# Patient Record
Sex: FEMALE | Race: WHITE | NOT HISPANIC OR LATINO | Employment: OTHER | URBAN - METROPOLITAN AREA
[De-identification: names, ages, dates, MRNs, and addresses within clinical notes are randomized per-mention and may not be internally consistent; named-entity substitution may affect disease eponyms.]

---

## 2023-03-13 LAB — SEDIMENTATION RATE, ERYTHROCYTE: 2 MM/H (ref 0–30)

## 2023-03-14 LAB — C REACTIVE PROTEIN (MG/L) IN SER/PLAS BY HIGH SENSIT: 1 MG/L

## 2023-11-20 DIAGNOSIS — E03.9 ACQUIRED HYPOTHYROIDISM: Primary | ICD-10-CM

## 2023-11-20 RX ORDER — LEVOTHYROXINE SODIUM 88 UG/1
88 TABLET ORAL
COMMUNITY
Start: 2023-01-26 | End: 2023-11-20 | Stop reason: SDUPTHER

## 2023-11-20 RX ORDER — LEVOTHYROXINE SODIUM 88 UG/1
88 TABLET ORAL DAILY
Qty: 30 TABLET | Refills: 11 | Status: SHIPPED | OUTPATIENT
Start: 2023-11-20 | End: 2024-11-19

## 2023-12-10 RX ORDER — LEVOTHYROXINE SODIUM 88 UG/1
88 TABLET ORAL
Qty: 30 TABLET | Refills: 11 | Status: SHIPPED | OUTPATIENT
Start: 2023-12-10

## 2024-05-22 ENCOUNTER — HOSPITAL ENCOUNTER (OUTPATIENT)
Dept: RADIOLOGY | Facility: HOSPITAL | Age: 79
Discharge: HOME | End: 2024-05-22
Payer: MEDICARE

## 2024-05-22 DIAGNOSIS — M79.605 LEFT LEG PAIN: ICD-10-CM

## 2024-05-22 PROCEDURE — 93971 EXTREMITY STUDY: CPT | Performed by: RADIOLOGY

## 2024-05-22 PROCEDURE — 93971 EXTREMITY STUDY: CPT

## 2024-07-01 ENCOUNTER — APPOINTMENT (OUTPATIENT)
Dept: OTOLARYNGOLOGY | Facility: CLINIC | Age: 79
End: 2024-07-01
Payer: COMMERCIAL

## 2024-07-01 ENCOUNTER — APPOINTMENT (OUTPATIENT)
Dept: AUDIOLOGY | Facility: CLINIC | Age: 79
End: 2024-07-01
Payer: MEDICARE

## 2024-07-01 VITALS — HEIGHT: 60 IN | BODY MASS INDEX: 24.54 KG/M2 | WEIGHT: 125 LBS

## 2024-07-01 DIAGNOSIS — H92.02 LEFT EAR PAIN: ICD-10-CM

## 2024-07-01 DIAGNOSIS — H90.3 BILATERAL SENSORINEURAL HEARING LOSS: ICD-10-CM

## 2024-07-01 DIAGNOSIS — L30.9 DERMATITIS: ICD-10-CM

## 2024-07-01 DIAGNOSIS — H90.3 SENSORINEURAL HEARING LOSS (SNHL) OF BOTH EARS: ICD-10-CM

## 2024-07-01 DIAGNOSIS — R42 DIZZINESS: Primary | ICD-10-CM

## 2024-07-01 PROCEDURE — 1036F TOBACCO NON-USER: CPT | Performed by: OTOLARYNGOLOGY

## 2024-07-01 PROCEDURE — 92550 TYMPANOMETRY & REFLEX THRESH: CPT | Performed by: AUDIOLOGIST

## 2024-07-01 PROCEDURE — 99214 OFFICE O/P EST MOD 30 MIN: CPT | Performed by: OTOLARYNGOLOGY

## 2024-07-01 PROCEDURE — 92557 COMPREHENSIVE HEARING TEST: CPT | Performed by: AUDIOLOGIST

## 2024-07-01 PROCEDURE — 1159F MED LIST DOCD IN RCRD: CPT | Performed by: OTOLARYNGOLOGY

## 2024-07-01 PROCEDURE — 1160F RVW MEDS BY RX/DR IN RCRD: CPT | Performed by: OTOLARYNGOLOGY

## 2024-07-01 RX ORDER — FLUOCINOLONE ACETONIDE 0.11 MG/ML
OIL AURICULAR (OTIC)
Qty: 20 ML | Refills: 1 | Status: SHIPPED | OUTPATIENT
Start: 2024-07-01

## 2024-07-01 NOTE — PROGRESS NOTES
"HPI  Patricia Rodriguez is a 78 y.o. female last seen in 2022 with some imbalance.  She feels that since COVID in late 2022 she has had a form of dizziness that seems to be positional when she stands up or when she looks up to put eyedrops and rolls over in bed.  It is shortness of limited.  Ibuprofen seems to help.  She has not had any specific hearing loss.  Audiogram today with bilateral symmetric downsloping sensorineural hearing loss.  She was seen by Dr. Lerner in May with similar complaints.  She was advised to have vestibular testing done at that time.  This appears to have been performed June 14 and demonstrated the vestibular system appearing to be normal.      Past Medical History:   Diagnosis Date    Other chest pain     Atypical chest pain    Other conditions influencing health status     Cervical Dysplasia    Personal history of other diseases of the digestive system     History of hiatal hernia    Personal history of other diseases of the musculoskeletal system and connective tissue     History of low back pain            Medications:     Current Outpatient Medications:     levothyroxine (Synthroid, Levoxyl) 88 mcg tablet, Take 1 tablet (88 mcg) by mouth once daily., Disp: 30 tablet, Rfl: 11    levothyroxine (Synthroid, Levoxyl) 88 mcg tablet, Take 1 tablet (88 mcg) by mouth once daily in the morning. Take before meals., Disp: 30 tablet, Rfl: 11     Allergies:  No Known Allergies     Physical Exam:  Last Recorded Vitals  Height 1.511 m (4' 11.5\"), weight 56.7 kg (125 lb).  General:     General appearance: Well-developed, well-nourished in no acute distress.       Voice:  normal       Head/face: Normal appearance; nontender to palpation     Facial nerve function: Normal and symmetric bilaterally.    Oral/oropharynx:     Oral vestibule: Normal labial and gingival mucosa     Tongue/floor of mouth: Normal without lesion     Oropharynx: Clear.  No lesions present of the hard/soft palate, posterior " pharynx    Neck:     Neck: Normal appearance, trachea midline     Salivary glands: Normal to palpation bilaterally     Lymph nodes: No cervical lymphadenopathy to palpation     Thyroid: No thyromegaly.  No palpable nodules     Range of motion: Normal    Neurological:     Cortical functions: Alert and oriented x3, appropriate affect       Larynx/hypopharynx:     Laryngeal findings: Mirror exam inadequate or limited secondary to enlarged base of tongue and/or excessive gagging    Ear:     Ear canal: Normal bilaterally     Tympanic membrane: Intact and mobile bilaterally     Pinna: Normal bilaterally     Hearing:  Gross hearing assessment normal by voice  [place]-Hallpike testing negative bilaterally    Nose:     Visualized using: Anterior rhinoscopy     Nasopharynx: Inadequate mirror exam secondary to gag, anatomy.       Nasal dorsum: Nontraumatic midline appearance     Septum: Midline     Inferior turbinates: Normally sized     Mucosa: Bilateral, pink, normal appearing       ASSESSMENT/PLAN:  Sounds like BPPV although the duration of symptoms is a little atypical.  [place]-Hallpike testing negative bilaterally.  Peripheral testing negative.  Recommend vestibular rehab.  Fluocinolone oil sent at her request.  She has used before for some flaky skin of the ear canal.  Follow-up as needed      Freddie Sewell MD

## 2024-07-03 NOTE — PROGRESS NOTES
AUDIOLOGY ADULT AUDIOMETRIC EVALUATION    Name:  Patricia Rodriguez  :  1945  Age:  78 y.o.  Date of Evaluation:  2024    Reason for visit: Patricia is seen in the clinic today at the request of otolaryngology for an audiologic evaluation.     HISTORY  Patient reports since having Covid in  she has he dizziness; positional.   Looking up, tilting head for eye drops; getting out of bed.  She reports she had MRI which was negative;  seen by neurology.  She reports constant left ear pain; varies in intensity.  She reports she can have difficulty understanding others depending on the speaker.      EVALUATION  See scanned audiogram: “Media” > “Audiology Report”.      RESULTS  Otoscopic Evaluation:  Right Ear: clear ear canal  Left Ear: clear ear canal    Immittance Measures:  Tympanometry:  Right Ear: Type As, reduced tympanic membrane mobility with normal middle ear pressure  Left Ear: Type Ad, hypercompliant tympanic membrane mobility with normal middle ear pressure    Acoustic Reflexes:  Ipsilateral Right Ear: Acoustic reflexes present within normal limits 500Hz through 4KHz   Ipsilateral Left Ear: could not seal  Contralateral Right Ear: did not evaluate  Contralateral Left Ear: did not evaluate    Distortion Product Otoacoustic Emissions (DPOAEs):  Right Ear: refer at all frequencies   Left Ear: refer at all frequencies     Audiometry:  Test Technique and Reliability:   Standard audiometry via supra-aural headphones. Reliability is good.    Pure tone air and bone conduction audiometry:  Mild sloping to severe sensorineural hearing loss at 1KHz and above bilaterally    Speech Audiometry (Word Recognition Scores):   Right Ear: Fair at most comfortable listening level of loudness   Left Ear: Fair at most comfortable listening level of loudness     IMPRESSIONS    Mild sloping to severe sensorineural hearing loss at 1KHz and above bilaterally    RECOMMENDATIONS  - Follow up with otolaryngology today as  scheduled; possible BPPV  - Annual audiologic evaluation, sooner if an acute change is noted.  - Hearing aid evaluation     PATIENT EDUCATION  Discussed results, impressions and recommendations with the patient. Questions were addressed and the patient was encouraged to contact our office should concerns arise.    Time for this encounter: 100/135    Ramandeep Nogueira M.A., CCC/A   Licensed Audiologist

## 2024-10-15 ENCOUNTER — OFFICE VISIT (OUTPATIENT)
Dept: PRIMARY CARE | Facility: CLINIC | Age: 79
End: 2024-10-15
Payer: MEDICARE

## 2024-10-15 VITALS
BODY MASS INDEX: 25.64 KG/M2 | OXYGEN SATURATION: 98 % | SYSTOLIC BLOOD PRESSURE: 122 MMHG | HEIGHT: 59 IN | TEMPERATURE: 98 F | DIASTOLIC BLOOD PRESSURE: 72 MMHG | WEIGHT: 127.2 LBS | HEART RATE: 72 BPM

## 2024-10-15 DIAGNOSIS — Z12.31 ENCOUNTER FOR SCREENING MAMMOGRAM FOR MALIGNANT NEOPLASM OF BREAST: ICD-10-CM

## 2024-10-15 DIAGNOSIS — E03.9 ACQUIRED HYPOTHYROIDISM: ICD-10-CM

## 2024-10-15 DIAGNOSIS — Z00.00 PHYSICAL EXAM: Primary | ICD-10-CM

## 2024-10-15 DIAGNOSIS — N32.81 OVERACTIVE BLADDER: ICD-10-CM

## 2024-10-15 DIAGNOSIS — Z13.6 SCREENING FOR HEART DISEASE: ICD-10-CM

## 2024-10-15 DIAGNOSIS — R53.83 TIRED: ICD-10-CM

## 2024-10-15 DIAGNOSIS — M25.551 RIGHT HIP PAIN: ICD-10-CM

## 2024-10-15 DIAGNOSIS — Z12.31 ENCOUNTER FOR SCREENING MAMMOGRAM FOR BREAST CANCER: ICD-10-CM

## 2024-10-15 DIAGNOSIS — F51.01 PRIMARY INSOMNIA: ICD-10-CM

## 2024-10-15 DIAGNOSIS — B35.9 TINEA: ICD-10-CM

## 2024-10-15 PROCEDURE — 1170F FXNL STATUS ASSESSED: CPT | Performed by: FAMILY MEDICINE

## 2024-10-15 PROCEDURE — 1160F RVW MEDS BY RX/DR IN RCRD: CPT | Performed by: FAMILY MEDICINE

## 2024-10-15 PROCEDURE — 1159F MED LIST DOCD IN RCRD: CPT | Performed by: FAMILY MEDICINE

## 2024-10-15 PROCEDURE — 99215 OFFICE O/P EST HI 40 MIN: CPT | Performed by: FAMILY MEDICINE

## 2024-10-15 PROCEDURE — G0439 PPPS, SUBSEQ VISIT: HCPCS | Performed by: FAMILY MEDICINE

## 2024-10-15 RX ORDER — ECONAZOLE NITRATE 10 MG/G
CREAM TOPICAL 2 TIMES DAILY PRN
Qty: 30 G | Refills: 0 | Status: SHIPPED | OUTPATIENT
Start: 2024-10-15 | End: 2025-02-12

## 2024-10-15 RX ORDER — HYDROXYZINE HYDROCHLORIDE 25 MG/1
25 TABLET, FILM COATED ORAL NIGHTLY PRN
Qty: 30 TABLET | Refills: 5 | Status: SHIPPED | OUTPATIENT
Start: 2024-10-15

## 2024-10-15 RX ORDER — ECONAZOLE NITRATE 10 MG/G
CREAM TOPICAL 2 TIMES DAILY PRN
Qty: 30 G | Refills: 0 | Status: SHIPPED | OUTPATIENT
Start: 2024-10-15 | End: 2024-10-15

## 2024-10-15 RX ORDER — SOLIFENACIN SUCCINATE 5 MG/1
5 TABLET, FILM COATED ORAL DAILY
Qty: 30 TABLET | Refills: 11 | Status: SHIPPED | OUTPATIENT
Start: 2024-10-15 | End: 2025-10-15

## 2024-10-15 RX ORDER — LEVOTHYROXINE SODIUM 88 UG/1
88 TABLET ORAL
Qty: 30 TABLET | Refills: 11 | Status: SHIPPED | OUTPATIENT
Start: 2024-10-15

## 2024-10-15 RX ORDER — GUAIFENESIN 1200 MG
1 TABLET, EXTENDED RELEASE 12 HR ORAL
COMMUNITY

## 2024-10-15 RX ORDER — LEVOTHYROXINE SODIUM 88 UG/1
88 TABLET ORAL
Qty: 30 TABLET | Refills: 11 | Status: SHIPPED | OUTPATIENT
Start: 2024-10-15 | End: 2024-10-15

## 2024-10-15 RX ORDER — ACETAMINOPHEN 500 MG
500 TABLET ORAL EVERY 8 HOURS
COMMUNITY
Start: 2023-03-13

## 2024-10-15 ASSESSMENT — ACTIVITIES OF DAILY LIVING (ADL)
DOING_HOUSEWORK: INDEPENDENT
TAKING_MEDICATION: INDEPENDENT
MANAGING_FINANCES: INDEPENDENT
GROCERY_SHOPPING: INDEPENDENT
DRESSING: INDEPENDENT
BATHING: INDEPENDENT

## 2024-10-15 ASSESSMENT — PATIENT HEALTH QUESTIONNAIRE - PHQ9
2. FEELING DOWN, DEPRESSED OR HOPELESS: NOT AT ALL
1. LITTLE INTEREST OR PLEASURE IN DOING THINGS: NOT AT ALL
SUM OF ALL RESPONSES TO PHQ9 QUESTIONS 1 AND 2: 0

## 2024-10-15 ASSESSMENT — ENCOUNTER SYMPTOMS
LOSS OF SENSATION IN FEET: 0
DEPRESSION: 0
OCCASIONAL FEELINGS OF UNSTEADINESS: 0

## 2024-10-16 ENCOUNTER — TELEPHONE (OUTPATIENT)
Dept: PRIMARY CARE | Facility: CLINIC | Age: 79
End: 2024-10-16

## 2024-10-16 ENCOUNTER — LAB (OUTPATIENT)
Dept: LAB | Facility: LAB | Age: 79
End: 2024-10-16
Payer: COMMERCIAL

## 2024-10-16 ENCOUNTER — HOSPITAL ENCOUNTER (OUTPATIENT)
Dept: RADIOLOGY | Facility: CLINIC | Age: 79
Discharge: HOME | End: 2024-10-16
Payer: MEDICARE

## 2024-10-16 DIAGNOSIS — M25.551 RIGHT HIP PAIN: ICD-10-CM

## 2024-10-16 DIAGNOSIS — R53.83 TIRED: ICD-10-CM

## 2024-10-16 DIAGNOSIS — E03.9 ACQUIRED HYPOTHYROIDISM: ICD-10-CM

## 2024-10-16 DIAGNOSIS — Z00.00 PHYSICAL EXAM: ICD-10-CM

## 2024-10-16 DIAGNOSIS — Z13.6 SCREENING FOR HEART DISEASE: ICD-10-CM

## 2024-10-16 LAB
ALBUMIN SERPL BCP-MCNC: 4 G/DL (ref 3.4–5)
ALP SERPL-CCNC: 65 U/L (ref 33–136)
ALT SERPL W P-5'-P-CCNC: 17 U/L (ref 7–45)
ANION GAP SERPL CALCULATED.3IONS-SCNC: 10 MMOL/L (ref 10–20)
AST SERPL W P-5'-P-CCNC: 24 U/L (ref 9–39)
BASOPHILS # BLD AUTO: 0.03 X10*3/UL (ref 0–0.1)
BASOPHILS NFR BLD AUTO: 0.4 %
BILIRUB SERPL-MCNC: 0.6 MG/DL (ref 0–1.2)
BUN SERPL-MCNC: 9 MG/DL (ref 6–23)
CALCIUM SERPL-MCNC: 9.4 MG/DL (ref 8.6–10.3)
CHLORIDE SERPL-SCNC: 103 MMOL/L (ref 98–107)
CHOLEST SERPL-MCNC: 173 MG/DL (ref 0–199)
CHOLEST/HDLC SERPL: 2.5 {RATIO}
CO2 SERPL-SCNC: 33 MMOL/L (ref 21–32)
CREAT SERPL-MCNC: 0.72 MG/DL (ref 0.5–1.05)
EGFRCR SERPLBLD CKD-EPI 2021: 85 ML/MIN/1.73M*2
EOSINOPHIL # BLD AUTO: 0.09 X10*3/UL (ref 0–0.4)
EOSINOPHIL NFR BLD AUTO: 1.3 %
ERYTHROCYTE [DISTWIDTH] IN BLOOD BY AUTOMATED COUNT: 12.2 % (ref 11.5–14.5)
GLUCOSE SERPL-MCNC: 85 MG/DL (ref 74–99)
HCT VFR BLD AUTO: 46.6 % (ref 36–46)
HDLC SERPL-MCNC: 68.2 MG/DL
HGB BLD-MCNC: 15.3 G/DL (ref 12–16)
IMM GRANULOCYTES # BLD AUTO: 0.02 X10*3/UL (ref 0–0.5)
IMM GRANULOCYTES NFR BLD AUTO: 0.3 % (ref 0–0.9)
LDLC SERPL CALC-MCNC: 90 MG/DL
LYMPHOCYTES # BLD AUTO: 1.93 X10*3/UL (ref 0.8–3)
LYMPHOCYTES NFR BLD AUTO: 27.5 %
MCH RBC QN AUTO: 32.4 PG (ref 26–34)
MCHC RBC AUTO-ENTMCNC: 32.8 G/DL (ref 32–36)
MCV RBC AUTO: 99 FL (ref 80–100)
MONOCYTES # BLD AUTO: 0.69 X10*3/UL (ref 0.05–0.8)
MONOCYTES NFR BLD AUTO: 9.8 %
NEUTROPHILS # BLD AUTO: 4.27 X10*3/UL (ref 1.6–5.5)
NEUTROPHILS NFR BLD AUTO: 60.7 %
NON HDL CHOLESTEROL: 105 MG/DL (ref 0–149)
NRBC BLD-RTO: 0 /100 WBCS (ref 0–0)
PLATELET # BLD AUTO: 356 X10*3/UL (ref 150–450)
POTASSIUM SERPL-SCNC: 4 MMOL/L (ref 3.5–5.3)
PROT SERPL-MCNC: 7.1 G/DL (ref 6.4–8.2)
RBC # BLD AUTO: 4.72 X10*6/UL (ref 4–5.2)
SODIUM SERPL-SCNC: 142 MMOL/L (ref 136–145)
TRIGL SERPL-MCNC: 74 MG/DL (ref 0–149)
TSH SERPL-ACNC: 0.71 MIU/L (ref 0.44–3.98)
VIT B12 SERPL-MCNC: 392 PG/ML (ref 211–911)
VLDL: 15 MG/DL (ref 0–40)
WBC # BLD AUTO: 7 X10*3/UL (ref 4.4–11.3)

## 2024-10-16 PROCEDURE — 82607 VITAMIN B-12: CPT

## 2024-10-16 PROCEDURE — 73502 X-RAY EXAM HIP UNI 2-3 VIEWS: CPT | Mod: RT

## 2024-10-16 PROCEDURE — 73502 X-RAY EXAM HIP UNI 2-3 VIEWS: CPT | Mod: RIGHT SIDE | Performed by: RADIOLOGY

## 2024-10-16 NOTE — TELEPHONE ENCOUNTER
Patient was here yesterday requesting chest xray due to discomfort in chest when sitting and when she lay down it bothers her more her mother had lung cancer so she is always worried please advise

## 2024-11-13 ENCOUNTER — HOSPITAL ENCOUNTER (OUTPATIENT)
Dept: RADIOLOGY | Facility: HOSPITAL | Age: 79
Discharge: HOME | End: 2024-11-13
Payer: MEDICARE

## 2024-11-13 DIAGNOSIS — Z12.31 ENCOUNTER FOR SCREENING MAMMOGRAM FOR BREAST CANCER: ICD-10-CM

## 2024-11-13 PROCEDURE — 77063 BREAST TOMOSYNTHESIS BI: CPT | Performed by: RADIOLOGY

## 2024-11-13 PROCEDURE — 77067 SCR MAMMO BI INCL CAD: CPT | Performed by: RADIOLOGY

## 2024-11-13 PROCEDURE — 77067 SCR MAMMO BI INCL CAD: CPT

## 2024-12-08 ASSESSMENT — ENCOUNTER SYMPTOMS
DIFFICULTY URINATING: 0
DIARRHEA: 0
SHORTNESS OF BREATH: 0
ENDOCRINE NEGATIVE: 1
DIZZINESS: 0
FEVER: 0
NAUSEA: 0

## 2024-12-09 NOTE — PROGRESS NOTES
"Subjective   Patient ID: Patricia Rodriguez is a 78 y.o. female who presents for Medicare Annual Wellness Visit Subsequent.    Physical exam  Hypothyroidism  Urinary incontinence frequent urination, overactive bladder  Insomnia  Right hip pain worse with ambulation  Tinea  Fatigue  Mammogram ordered         Review of Systems   Constitutional:  Negative for fever.        Also see HPI   Eyes:  Negative for visual disturbance.   Respiratory:  Negative for shortness of breath.    Cardiovascular:  Negative for chest pain.   Gastrointestinal:  Negative for diarrhea and nausea.   Endocrine: Negative.    Genitourinary:  Negative for difficulty urinating.   Skin:  Negative for rash.   Neurological:  Negative for dizziness.        No focal deficits   Psychiatric/Behavioral:  Negative for suicidal ideas.    All other systems reviewed and are negative.      Objective   /72   Pulse 72   Temp 36.7 °C (98 °F)   Ht 1.499 m (4' 11\")   Wt 57.7 kg (127 lb 3.2 oz)   LMP  (LMP Unknown)   SpO2 98%   BMI 25.69 kg/m²     Physical Exam  Vitals and nursing note reviewed.   Constitutional:       Appearance: Normal appearance.   HENT:      Head: Normocephalic and atraumatic.      Right Ear: Tympanic membrane, ear canal and external ear normal.      Left Ear: Tympanic membrane, ear canal and external ear normal.      Nose: Nose normal.      Mouth/Throat:      Mouth: Mucous membranes are moist.      Pharynx: Oropharynx is clear.   Eyes:      Extraocular Movements: Extraocular movements intact.      Conjunctiva/sclera: Conjunctivae normal.      Pupils: Pupils are equal, round, and reactive to light.   Cardiovascular:      Rate and Rhythm: Normal rate and regular rhythm.      Heart sounds: Normal heart sounds.   Pulmonary:      Effort: No respiratory distress.      Breath sounds: Normal breath sounds.   Abdominal:      General: Bowel sounds are normal. There is no distension.      Palpations: Abdomen is soft. There is no mass.      " Tenderness: There is no abdominal tenderness.   Musculoskeletal:      Cervical back: Normal range of motion and neck supple.      Right hip: Tenderness present.      Right lower leg: No edema.      Left lower leg: No edema.   Skin:     Coloration: Skin is not jaundiced.      Findings: No rash.   Neurological:      General: No focal deficit present.      Mental Status: She is alert and oriented to person, place, and time.   Psychiatric:         Mood and Affect: Mood normal.         Speech: Speech normal.         Behavior: Behavior normal.         Thought Content: Thought content normal.         Judgment: Judgment normal.         Assessment/Plan   Diagnoses and all orders for this visit:  Physical exam  -     CBC and Auto Differential; Future  -     Comprehensive Metabolic Panel; Future  -     TSH with reflex to Free T4 if abnormal; Future  -     Lipid Panel; Future  -     Vitamin B12; Future  Acquired hypothyroidism  -     levothyroxine (Synthroid, Levoxyl) 88 mcg tablet; Take 1 tablet (88 mcg) by mouth once daily in the morning. Take before meals.  -     TSH with reflex to Free T4 if abnormal; Future  Tinea  -     econazole nitrate 1 % cream; Apply topically 2 times a day as needed for irritation or rash.  Right hip pain  -     XR hip right with pelvis when performed 2 or 3 views; Future  Overactive bladder  -     solifenacin (Vesicare) 5 mg tablet; Take 1 tablet (5 mg) by mouth once daily. Swallow tablet whole; do not crush, chew, or split.  Primary insomnia  -     hydrOXYzine HCL (Atarax) 25 mg tablet; Take 1 tablet (25 mg) by mouth as needed at bedtime (insomnia).  Encounter for screening mammogram for malignant neoplasm of breast  Screening for heart disease  -     Lipid Panel; Future  Tired  -     CBC and Auto Differential; Future  -     Comprehensive Metabolic Panel; Future  -     TSH with reflex to Free T4 if abnormal; Future  -     Vitamin B12; Future  Encounter for screening mammogram for breast cancer  -      BI mammo bilateral screening tomosynthesis; Future

## 2025-03-17 ENCOUNTER — APPOINTMENT (OUTPATIENT)
Dept: CARDIOLOGY | Facility: CLINIC | Age: 80
End: 2025-03-17
Payer: MEDICARE

## 2025-04-08 ENCOUNTER — APPOINTMENT (OUTPATIENT)
Dept: CARDIOLOGY | Facility: CLINIC | Age: 80
End: 2025-04-08
Payer: MEDICARE

## 2025-04-08 VITALS
OXYGEN SATURATION: 96 % | BODY MASS INDEX: 25.04 KG/M2 | DIASTOLIC BLOOD PRESSURE: 84 MMHG | HEART RATE: 66 BPM | SYSTOLIC BLOOD PRESSURE: 116 MMHG | WEIGHT: 124 LBS | RESPIRATION RATE: 16 BRPM

## 2025-04-08 DIAGNOSIS — E78.2 MIXED HYPERLIPIDEMIA: Primary | ICD-10-CM

## 2025-04-08 DIAGNOSIS — R07.89 OTHER CHEST PAIN: ICD-10-CM

## 2025-04-08 PROCEDURE — 1159F MED LIST DOCD IN RCRD: CPT | Performed by: INTERNAL MEDICINE

## 2025-04-08 PROCEDURE — 99203 OFFICE O/P NEW LOW 30 MIN: CPT | Performed by: INTERNAL MEDICINE

## 2025-04-08 PROCEDURE — 1126F AMNT PAIN NOTED NONE PRSNT: CPT | Performed by: INTERNAL MEDICINE

## 2025-04-08 PROCEDURE — 1160F RVW MEDS BY RX/DR IN RCRD: CPT | Performed by: INTERNAL MEDICINE

## 2025-04-08 PROCEDURE — 1036F TOBACCO NON-USER: CPT | Performed by: INTERNAL MEDICINE

## 2025-04-08 ASSESSMENT — LIFESTYLE VARIABLES
HOW OFTEN DO YOU HAVE SIX OR MORE DRINKS ON ONE OCCASION: NEVER
HAVE YOU OR SOMEONE ELSE BEEN INJURED AS A RESULT OF YOUR DRINKING: NO
HAS A RELATIVE, FRIEND, DOCTOR, OR ANOTHER HEALTH PROFESSIONAL EXPRESSED CONCERN ABOUT YOUR DRINKING OR SUGGESTED YOU CUT DOWN: NO
AUDIT-C TOTAL SCORE: 0
SKIP TO QUESTIONS 9-10: 1
HOW MANY STANDARD DRINKS CONTAINING ALCOHOL DO YOU HAVE ON A TYPICAL DAY: PATIENT DOES NOT DRINK
AUDIT TOTAL SCORE: 0
HOW OFTEN DO YOU HAVE A DRINK CONTAINING ALCOHOL: NEVER

## 2025-04-08 ASSESSMENT — ENCOUNTER SYMPTOMS
OCCASIONAL FEELINGS OF UNSTEADINESS: 0
DEPRESSION: 0
LOSS OF SENSATION IN FEET: 0

## 2025-04-08 ASSESSMENT — PAIN SCALES - GENERAL: PAINLEVEL_OUTOF10: 0-NO PAIN

## 2025-04-08 NOTE — PROGRESS NOTES
Reason for the consult:  Chest pain    History of present illness:  This is a very pleasant 79-year-old female with no prior cardiac history.  Patient presents to my office with complaints of atypical chest pain.  Patient used to see Dr. Yanick Lindsey in the past she had stress test in May 2023 that showed normal ejection fraction no major Samantha.  She had also echocardiogram in the past as well as EKG all within normal limit.  Patient has been having this sharp or pressure intermittent episodes of quick chest pains happening on the left side.  She is signs the chest pain area with her fingertip.  These pains are not related to activity.  Denies any nausea vomiting diaphoresis or syncope.      Past Medical History:   Diagnosis Date    Other chest pain     Atypical chest pain    Other conditions influencing health status     Cervical Dysplasia    Personal history of other diseases of the digestive system     History of hiatal hernia    Personal history of other diseases of the musculoskeletal system and connective tissue     History of low back pain       Past Surgical History:   Procedure Laterality Date    APPENDECTOMY  04/29/2013    Appendectomy    TUBAL LIGATION  04/29/2013    Tubal Ligation       Allergies   Allergen Reactions    Amoxicillin Unknown        reports that she has never smoked. She has never used smokeless tobacco. She reports that she does not currently use alcohol. She reports that she does not use drugs.    Family History   Problem Relation Name Age of Onset    Breast cancer Mother  75       Patient's Medications   New Prescriptions    No medications on file   Previous Medications    ACETAMINOPHEN (TYLENOL) 325 MG CAPSULE    Take 1 capsule (325 mg) by mouth.    ACETAMINOPHEN (TYLENOL) 500 MG TABLET    Take 1 tablet (500 mg) by mouth every 8 hours.    FLUOCINOLONE (DERMOTIC) 0.01 % EAR DROPS    4 drops to affected ear as directed    HYDROXYZINE HCL (ATARAX) 25 MG TABLET    Take 1 tablet (25 mg) by  mouth as needed at bedtime (insomnia).    LEVOTHYROXINE (SYNTHROID, LEVOXYL) 88 MCG TABLET    Take 1 tablet (88 mcg) by mouth once daily in the morning. Take before meals.    SOLIFENACIN (VESICARE) 5 MG TABLET    Take 1 tablet (5 mg) by mouth once daily. Swallow tablet whole; do not crush, chew, or split.   Modified Medications    No medications on file   Discontinued Medications    No medications on file         Review of Systems  10 point review of systems otherwise negative     Objective   Physical Exam  General: Patient in no acute distress   HEENT: Atraumatic normocephalic.  Neck: Supple, jugular venous pressure within normal limit.  No bruits  Lungs: Clear to auscultation bilaterally  Cardiovascular: Regular rate and rhythm, normal heart sounds, no murmurs rubs or gallops  Abdomen: Soft nontender nondistended.  Normal bowel sounds.  Extremities: Warm to touch, no edema.  Neurologic examination: Patient is awake alert oriented x3.  Psychiatric examination: Patient denies being depressed or suicidal.  Good insight  Vascular examination: Pulses intact bilaterally     Lab Review   No visits with results within 2 Month(s) from this visit.   Latest known visit with results is:   Lab on 10/16/2024   Component Date Value    WBC 10/16/2024 7.0     nRBC 10/16/2024 0.0     RBC 10/16/2024 4.72     Hemoglobin 10/16/2024 15.3     Hematocrit 10/16/2024 46.6 (H)     MCV 10/16/2024 99     MCH 10/16/2024 32.4     MCHC 10/16/2024 32.8     RDW 10/16/2024 12.2     Platelets 10/16/2024 356     Neutrophils % 10/16/2024 60.7     Immature Granulocytes %,* 10/16/2024 0.3     Lymphocytes % 10/16/2024 27.5     Monocytes % 10/16/2024 9.8     Eosinophils % 10/16/2024 1.3     Basophils % 10/16/2024 0.4     Neutrophils Absolute 10/16/2024 4.27     Immature Granulocytes Ab* 10/16/2024 0.02     Lymphocytes Absolute 10/16/2024 1.93     Monocytes Absolute 10/16/2024 0.69     Eosinophils Absolute 10/16/2024 0.09     Basophils Absolute 10/16/2024  0.03     Glucose 10/16/2024 85     Sodium 10/16/2024 142     Potassium 10/16/2024 4.0     Chloride 10/16/2024 103     Bicarbonate 10/16/2024 33 (H)     Anion Gap 10/16/2024 10     Urea Nitrogen 10/16/2024 9     Creatinine 10/16/2024 0.72     eGFR 10/16/2024 85     Calcium 10/16/2024 9.4     Albumin 10/16/2024 4.0     Alkaline Phosphatase 10/16/2024 65     Total Protein 10/16/2024 7.1     AST 10/16/2024 24     Bilirubin, Total 10/16/2024 0.6     ALT 10/16/2024 17     Thyroid Stimulating Horm* 10/16/2024 0.71     Cholesterol 10/16/2024 173     HDL-Cholesterol 10/16/2024 68.2     Cholesterol/HDL Ratio 10/16/2024 2.5     LDL Calculated 10/16/2024 90     VLDL 10/16/2024 15     Triglycerides 10/16/2024 74     Non HDL Cholesterol 10/16/2024 105     Vitamin B12 10/16/2024 392         Assessment/Plan    This is a very pleasant 79-year-old female with no prior cardiac history.  Patient presents to my office with complaints of atypical chest pain.  Patient used to see Dr. Yanick Lindsey in the past she had stress test in May 2023 that showed normal ejection fraction no major Samantha.  She had also echocardiogram in the past as well as EKG all within normal limit.  Patient has been having this sharp or pressure intermittent episodes of quick chest pains happening on the left side.  She is signs the chest pain area with her fingertip.  These pains are not related to activity.  Denies any nausea vomiting diaphoresis or syncope.    Pain is atypical.  Denies having any additional symptoms.  Blood pressure noted well-controlled.  On optimal medical therapy.  Cholesterol well-controlled.  I really doubt this is cardiac.  Will obtain coronary calcium score to assess need for statin therapy if she has elevated calcium score we may consider cardiac catheterization if she remains symptomatic otherwise I will see her in 3 months.      Jose Alfredo Nguyen MD

## 2025-04-18 ENCOUNTER — HOSPITAL ENCOUNTER (OUTPATIENT)
Dept: RADIOLOGY | Facility: HOSPITAL | Age: 80
Discharge: HOME | End: 2025-04-18
Payer: MEDICARE

## 2025-04-18 DIAGNOSIS — E78.2 MIXED HYPERLIPIDEMIA: ICD-10-CM

## 2025-04-18 PROCEDURE — 75571 CT HRT W/O DYE W/CA TEST: CPT

## 2025-04-22 ENCOUNTER — TELEPHONE (OUTPATIENT)
Dept: CARDIOLOGY | Facility: CLINIC | Age: 80
End: 2025-04-22
Payer: COMMERCIAL

## 2025-04-22 DIAGNOSIS — R91.1 LUNG NODULE: Primary | ICD-10-CM

## 2025-04-22 NOTE — TELEPHONE ENCOUNTER
Spoke to patient about the result of her coronary calcium score.  Total score of 2 low risk.  However she has incidental lung nodule of 9 mm if new findings we will refer for lung nodule clinic.  Patient understands.

## 2025-04-30 ENCOUNTER — TELEPHONE (OUTPATIENT)
Dept: CARE COORDINATION | Facility: CLINIC | Age: 80
End: 2025-04-30
Payer: COMMERCIAL

## 2025-04-30 NOTE — PROGRESS NOTES
Outreach call to patient to assist in scheduling Annual Wellness Visit. Left voicemail with the following information below:    Our records indicate that you are due for your 2025 Yearly Annual Wellness Exam.    It is a very important part of your over all health to have your yearly exam with your Primary Care Provider.   During this appointment you will discuss routine screenings and vaccines you may be due for with your provider.         Please call your Primary Care Physician's office at your earliest convenience to get scheduled for your visit OR you may schedule through your [employer] account.     Sincerely,    Our caring team at [facility]     **Details below show attributed Primary Care Providers Office information:     [facility]  [address] [facility] [address]   Scheduling # [phone]

## 2025-05-02 ENCOUNTER — OFFICE VISIT (OUTPATIENT)
Dept: PULMONOLOGY | Facility: CLINIC | Age: 80
End: 2025-05-02
Payer: MEDICARE

## 2025-05-02 VITALS
RESPIRATION RATE: 16 BRPM | SYSTOLIC BLOOD PRESSURE: 175 MMHG | WEIGHT: 124 LBS | OXYGEN SATURATION: 96 % | BODY MASS INDEX: 25.04 KG/M2 | DIASTOLIC BLOOD PRESSURE: 75 MMHG | HEART RATE: 76 BPM

## 2025-05-02 DIAGNOSIS — R91.1 LUNG NODULE: Primary | ICD-10-CM

## 2025-05-02 DIAGNOSIS — R07.89 OTHER CHEST PAIN: ICD-10-CM

## 2025-05-02 PROCEDURE — 1160F RVW MEDS BY RX/DR IN RCRD: CPT | Performed by: INTERNAL MEDICINE

## 2025-05-02 PROCEDURE — 1159F MED LIST DOCD IN RCRD: CPT | Performed by: INTERNAL MEDICINE

## 2025-05-02 PROCEDURE — 1036F TOBACCO NON-USER: CPT | Performed by: INTERNAL MEDICINE

## 2025-05-02 PROCEDURE — 1126F AMNT PAIN NOTED NONE PRSNT: CPT | Performed by: INTERNAL MEDICINE

## 2025-05-02 PROCEDURE — 99204 OFFICE O/P NEW MOD 45 MIN: CPT | Performed by: INTERNAL MEDICINE

## 2025-05-02 PROCEDURE — 99214 OFFICE O/P EST MOD 30 MIN: CPT | Performed by: INTERNAL MEDICINE

## 2025-05-02 RX ORDER — FLUOROMETHOLONE 1 MG/ML
1 SUSPENSION/ DROPS OPHTHALMIC 2 TIMES DAILY
COMMUNITY
Start: 2025-04-07

## 2025-05-02 ASSESSMENT — ENCOUNTER SYMPTOMS
FEVER: 0
GASTROINTESTINAL NEGATIVE: 1
COUGH: 0
PSYCHIATRIC NEGATIVE: 1
RESPIRATORY NEGATIVE: 1
NEUROLOGICAL NEGATIVE: 1
DEPRESSION: 0
CHILLS: 0

## 2025-05-02 ASSESSMENT — COLUMBIA-SUICIDE SEVERITY RATING SCALE - C-SSRS
1. IN THE PAST MONTH, HAVE YOU WISHED YOU WERE DEAD OR WISHED YOU COULD GO TO SLEEP AND NOT WAKE UP?: NO
2. HAVE YOU ACTUALLY HAD ANY THOUGHTS OF KILLING YOURSELF?: NO
6. HAVE YOU EVER DONE ANYTHING, STARTED TO DO ANYTHING, OR PREPARED TO DO ANYTHING TO END YOUR LIFE?: NO

## 2025-05-02 ASSESSMENT — PAIN SCALES - GENERAL: PAINLEVEL_OUTOF10: 0-NO PAIN

## 2025-05-02 ASSESSMENT — PATIENT HEALTH QUESTIONNAIRE - PHQ9
SUM OF ALL RESPONSES TO PHQ9 QUESTIONS 1 AND 2: 0
2. FEELING DOWN, DEPRESSED OR HOPELESS: NOT AT ALL
1. LITTLE INTEREST OR PLEASURE IN DOING THINGS: NOT AT ALL

## 2025-05-02 NOTE — ASSESSMENT & PLAN NOTE
Clinical seems like GERD given pts symptoms and clinical history of previous GERD and gastric ulcer.  Advised pt to discuss with her PCP.

## 2025-05-02 NOTE — PROGRESS NOTES
Subjective   Patient ID: Patricia Rodriguez is a 79 y.o. female who presents for Lung Eval.  H/o hypothyroid here for evaluation of lung nodule.  9mm nodule seen on calcium scoring.  Has chest pains she describes in the center of her chest and on the right side.  No fevers, chills.  Has had cough since covid 2 years ago in morning and at night.  Has occasional night sweats.  No weight loss.  Denies any lung disease.  ET is about 75 yards.  Reports chest pain as a burning/squeezing sensation in the middle of her chest.  Reports acid reflux for years.  Was on PPI for awhile but stopped     Never smoker.  Worked in a lab making biopsy brushes/needles.  Had a dog.  Mother, aunts, uncles - lung Ca.              Review of Systems   Constitutional:  Negative for chills and fever.   Respiratory: Negative.  Negative for cough.    Cardiovascular:  Positive for chest pain.   Gastrointestinal: Negative.    Neurological: Negative.    Psychiatric/Behavioral: Negative.     All other systems reviewed and are negative.      Objective   Physical Exam  Vitals reviewed.   Constitutional:       Appearance: Normal appearance.   HENT:      Head: Normocephalic and atraumatic.   Eyes:      Extraocular Movements: Extraocular movements intact.   Cardiovascular:      Rate and Rhythm: Normal rate and regular rhythm.      Heart sounds: Normal heart sounds.   Pulmonary:      Effort: Pulmonary effort is normal.      Breath sounds: Normal breath sounds.   Abdominal:      Palpations: Abdomen is soft.      Tenderness: There is no abdominal tenderness.   Musculoskeletal:      Cervical back: Normal range of motion.   Skin:     General: Skin is warm.   Neurological:      General: No focal deficit present.      Mental Status: She is alert and oriented to person, place, and time. Mental status is at baseline.   Psychiatric:         Mood and Affect: Mood normal.         Behavior: Behavior normal.         Assessment/Plan   Problem List Items Addressed This Visit        Other chest pain    Clinical seems like GERD given pts symptoms and clinical history of previous GERD and gastric ulcer.  Advised pt to discuss with her PCP.         Lung nodule - Primary    Will get CT-chest w/o contrast to evaluate for other nodules particularly given family history of lung Ca.         Relevant Orders    CT chest wo IV contrast     RTC in 1-2 months    Time Spent  Prep time on day of patient encounter: 15 minutes  Time spent directly with patient, family or caregiver: 25 minutes  Additional Time Spent on Patient Care Activities: 0 minutes  Documentation Time: 5 minutes  Other Time Spent: 0 minutes  Total: 45 minutes        Pantera Cornejo MD 05/02/25 12:28 PM

## 2025-05-02 NOTE — ASSESSMENT & PLAN NOTE
Will get CT-chest w/o contrast to evaluate for other nodules particularly given family history of lung Ca.

## 2025-05-05 ENCOUNTER — HOSPITAL ENCOUNTER (OUTPATIENT)
Dept: RADIOLOGY | Facility: HOSPITAL | Age: 80
Discharge: HOME | End: 2025-05-05
Payer: MEDICARE

## 2025-05-05 DIAGNOSIS — R91.1 LUNG NODULE: ICD-10-CM

## 2025-05-05 PROCEDURE — 71250 CT THORAX DX C-: CPT | Performed by: RADIOLOGY

## 2025-05-05 PROCEDURE — 71250 CT THORAX DX C-: CPT

## 2025-06-03 ENCOUNTER — APPOINTMENT (OUTPATIENT)
Dept: PULMONOLOGY | Facility: CLINIC | Age: 80
End: 2025-06-03
Payer: MEDICARE

## 2025-06-19 ENCOUNTER — APPOINTMENT (OUTPATIENT)
Dept: CARDIOLOGY | Facility: CLINIC | Age: 80
End: 2025-06-19
Payer: MEDICARE

## 2025-06-19 VITALS
DIASTOLIC BLOOD PRESSURE: 60 MMHG | SYSTOLIC BLOOD PRESSURE: 138 MMHG | BODY MASS INDEX: 25.04 KG/M2 | WEIGHT: 124 LBS | HEART RATE: 72 BPM | RESPIRATION RATE: 16 BRPM | OXYGEN SATURATION: 98 %

## 2025-06-19 DIAGNOSIS — R07.89 OTHER CHEST PAIN: ICD-10-CM

## 2025-06-19 DIAGNOSIS — E78.2 MIXED HYPERLIPIDEMIA: ICD-10-CM

## 2025-06-19 DIAGNOSIS — I10 PRIMARY HYPERTENSION: Primary | ICD-10-CM

## 2025-06-19 PROCEDURE — 3078F DIAST BP <80 MM HG: CPT | Performed by: INTERNAL MEDICINE

## 2025-06-19 PROCEDURE — 1126F AMNT PAIN NOTED NONE PRSNT: CPT | Performed by: INTERNAL MEDICINE

## 2025-06-19 PROCEDURE — 1159F MED LIST DOCD IN RCRD: CPT | Performed by: INTERNAL MEDICINE

## 2025-06-19 PROCEDURE — 3075F SYST BP GE 130 - 139MM HG: CPT | Performed by: INTERNAL MEDICINE

## 2025-06-19 PROCEDURE — 99214 OFFICE O/P EST MOD 30 MIN: CPT | Performed by: INTERNAL MEDICINE

## 2025-06-19 PROCEDURE — 1160F RVW MEDS BY RX/DR IN RCRD: CPT | Performed by: INTERNAL MEDICINE

## 2025-06-19 ASSESSMENT — ENCOUNTER SYMPTOMS
DEPRESSION: 0
OCCASIONAL FEELINGS OF UNSTEADINESS: 0
LOSS OF SENSATION IN FEET: 0

## 2025-06-19 ASSESSMENT — LIFESTYLE VARIABLES
HOW OFTEN DO YOU HAVE A DRINK CONTAINING ALCOHOL: NEVER
HOW OFTEN DO YOU HAVE SIX OR MORE DRINKS ON ONE OCCASION: NEVER
HOW MANY STANDARD DRINKS CONTAINING ALCOHOL DO YOU HAVE ON A TYPICAL DAY: PATIENT DOES NOT DRINK
AUDIT-C TOTAL SCORE: 0
HAS A RELATIVE, FRIEND, DOCTOR, OR ANOTHER HEALTH PROFESSIONAL EXPRESSED CONCERN ABOUT YOUR DRINKING OR SUGGESTED YOU CUT DOWN: NO
SKIP TO QUESTIONS 9-10: 1
AUDIT TOTAL SCORE: 0
HAVE YOU OR SOMEONE ELSE BEEN INJURED AS A RESULT OF YOUR DRINKING: NO

## 2025-06-19 ASSESSMENT — PATIENT HEALTH QUESTIONNAIRE - PHQ9
SUM OF ALL RESPONSES TO PHQ9 QUESTIONS 1 AND 2: 0
1. LITTLE INTEREST OR PLEASURE IN DOING THINGS: NOT AT ALL
2. FEELING DOWN, DEPRESSED OR HOPELESS: NOT AT ALL

## 2025-06-19 ASSESSMENT — PAIN SCALES - GENERAL: PAINLEVEL_OUTOF10: 0-NO PAIN

## 2025-06-19 NOTE — PROGRESS NOTES
[facility] [place]    Interventional Cardiology    History of present illness:  This is a very pleasant 79-year-old female with no prior cardiac history.  Patient presents to my office with complaints of atypical chest pain.  Patient used to see Dr. Yanick Lindsey in the past. she had stress test in May 2023 that showed normal ejection fraction no major Samantha.  She had also echocardiogram in the past as well as EKG all within normal limit.  Patient coronary calcium score in April 18, 2025 showed total score of 2 very low risk.  She had back then some infiltrates nonspecific and she is following with pulmonary for that.  Patient still  having this sharp or pressure intermittent episodes of quick chest pains happening on the left side.  She is signs the chest pain area with her fingertip.  These pains are not related to activity.  Denies any nausea vomiting diaphoresis or syncope.    Medical History[1]    Surgical History[2]    Allergies[3]     reports that she has never smoked. She has never used smokeless tobacco. She reports that she does not currently use alcohol. She reports that she does not use drugs.    Family History[4]    Patient's Medications   New Prescriptions    No medications on file   Previous Medications    ACETAMINOPHEN (TYLENOL) 325 MG CAPSULE    Take 1 capsule (325 mg) by mouth.    FLUOCINOLONE (DERMOTIC) 0.01 % EAR DROPS    4 drops to affected ear as directed    FLUOROMETHOLONE (FML) 0.1 % OPHTHALMIC SUSPENSION    Administer 1 drop into affected eye(s) twice a day.    LEVOTHYROXINE (SYNTHROID, LEVOXYL) 88 MCG TABLET    Take 1 tablet (88 mcg) by mouth once daily in the morning. Take before meals.   Modified Medications    No medications on file   Discontinued Medications    No medications on file       Objective   Physical Exam  General: Patient in no acute distress   HEENT: Atraumatic normocephalic.  Neck: Supple, jugular venous pressure within normal  limit.  No bruits  Lungs: Clear to auscultation bilaterally  Cardiovascular: Regular rate and rhythm, normal heart sounds, no murmurs rubs or gallops  Abdomen: Soft nontender nondistended.  Normal bowel sounds.  Extremities: Warm to touch, no edema.    Lab Review   No visits with results within 2 Month(s) from this visit.   Latest known visit with results is:   Lab on 10/16/2024   Component Date Value    WBC 10/16/2024 7.0     nRBC 10/16/2024 0.0     RBC 10/16/2024 4.72     Hemoglobin 10/16/2024 15.3     Hematocrit 10/16/2024 46.6 (H)     MCV 10/16/2024 99     MCH 10/16/2024 32.4     MCHC 10/16/2024 32.8     RDW 10/16/2024 12.2     Platelets 10/16/2024 356     Neutrophils % 10/16/2024 60.7     Immature Granulocytes %,* 10/16/2024 0.3     Lymphocytes % 10/16/2024 27.5     Monocytes % 10/16/2024 9.8     Eosinophils % 10/16/2024 1.3     Basophils % 10/16/2024 0.4     Neutrophils Absolute 10/16/2024 4.27     Immature Granulocytes Ab* 10/16/2024 0.02     Lymphocytes Absolute 10/16/2024 1.93     Monocytes Absolute 10/16/2024 0.69     Eosinophils Absolute 10/16/2024 0.09     Basophils Absolute 10/16/2024 0.03     Glucose 10/16/2024 85     Sodium 10/16/2024 142     Potassium 10/16/2024 4.0     Chloride 10/16/2024 103     Bicarbonate 10/16/2024 33 (H)     Anion Gap 10/16/2024 10     Urea Nitrogen 10/16/2024 9     Creatinine 10/16/2024 0.72     eGFR 10/16/2024 85     Calcium 10/16/2024 9.4     Albumin 10/16/2024 4.0     Alkaline Phosphatase 10/16/2024 65     Total Protein 10/16/2024 7.1     AST 10/16/2024 24     Bilirubin, Total 10/16/2024 0.6     ALT 10/16/2024 17     Thyroid Stimulating Horm* 10/16/2024 0.71     Cholesterol 10/16/2024 173     HDL-Cholesterol 10/16/2024 68.2     Cholesterol/HDL Ratio 10/16/2024 2.5     LDL Calculated 10/16/2024 90     VLDL 10/16/2024 15     Triglycerides 10/16/2024 74     Non HDL Cholesterol 10/16/2024 105     Vitamin B12 10/16/2024 392         Assessment/Plan   Problem List[5]   This is a  very pleasant 79-year-old female with no prior cardiac history.  Patient presents to my office with complaints of atypical chest pain.  Patient used to see Dr. Yanick Lindsey in the past. she had stress test in May 2023 that showed normal ejection fraction no major Samantha.  She had also echocardiogram in the past as well as EKG all within normal limit.  Patient coronary calcium score in April 18, 2025 showed total score of 2 very low risk.  She had back then some infiltrates nonspecific and she is following with pulmonary for that.  Patient still  having this sharp or pressure intermittent episodes of quick chest pains happening on the left side.  She is signs the chest pain area with her fingertip.  These pains are not related to activity.  Denies any nausea vomiting diaphoresis or syncope.     Patient continues to have episodes of atypical chest pain.  Blood pressure on the high normal.  I will start patient on small dose amlodipine 2.5 mg oral daily to see if it helps with the hypertension.  The other hand she may use nitroglycerin which I will prescribe to her as needed when she has the pains to see if that will help with the chest pain.  We will follow-up in 6 months.  Patient to call me if there is any change in her status.  Most likely her chest pain is musculoskeletal not related to coronary artery disease.  Cussed with her lifestyle modification.  Reviewed labs.  LDL at target.    Jose Alfredo Nguyen MD              [1]   Past Medical History:  Diagnosis Date    Other chest pain     Atypical chest pain    Other conditions influencing health status     Cervical Dysplasia    Personal history of other diseases of the digestive system     History of hiatal hernia    Personal history of other diseases of the musculoskeletal system and connective tissue     History of low back pain   [2]   Past Surgical History:  Procedure Laterality Date    APPENDECTOMY  04/29/2013    Appendectomy    TUBAL LIGATION  04/29/2013    Tubal  Ligation   [3]   Allergies  Allergen Reactions    Amoxicillin Unknown   [4]   Family History  Problem Relation Name Age of Onset    Breast cancer Mother  75   [5]   Patient Active Problem List  Diagnosis    Mixed hyperlipidemia    Other chest pain    Lung nodule

## 2025-06-20 RX ORDER — AMLODIPINE BESYLATE 2.5 MG/1
2.5 TABLET ORAL DAILY
Qty: 30 TABLET | Refills: 11 | Status: SHIPPED | OUTPATIENT
Start: 2025-06-20 | End: 2026-06-20

## 2025-06-20 RX ORDER — NITROGLYCERIN 0.4 MG/1
0.4 TABLET SUBLINGUAL EVERY 5 MIN PRN
Qty: 30 TABLET | Refills: 0 | Status: SHIPPED | OUTPATIENT
Start: 2025-06-20 | End: 2025-07-20

## 2025-06-25 PROBLEM — I10 PRIMARY HYPERTENSION: Status: ACTIVE | Noted: 2025-06-25

## 2025-07-16 ENCOUNTER — OFFICE VISIT (OUTPATIENT)
Dept: PULMONOLOGY | Facility: CLINIC | Age: 80
End: 2025-07-16
Payer: MEDICARE

## 2025-07-16 VITALS
WEIGHT: 124.9 LBS | BODY MASS INDEX: 25.23 KG/M2 | HEART RATE: 71 BPM | SYSTOLIC BLOOD PRESSURE: 156 MMHG | RESPIRATION RATE: 16 BRPM | DIASTOLIC BLOOD PRESSURE: 76 MMHG | OXYGEN SATURATION: 96 %

## 2025-07-16 DIAGNOSIS — R91.1 LUNG NODULE: ICD-10-CM

## 2025-07-16 DIAGNOSIS — R07.89 OTHER CHEST PAIN: Primary | ICD-10-CM

## 2025-07-16 PROCEDURE — 99211 OFF/OP EST MAY X REQ PHY/QHP: CPT

## 2025-07-16 PROCEDURE — 3077F SYST BP >= 140 MM HG: CPT | Performed by: INTERNAL MEDICINE

## 2025-07-16 PROCEDURE — 1159F MED LIST DOCD IN RCRD: CPT | Performed by: INTERNAL MEDICINE

## 2025-07-16 PROCEDURE — 1160F RVW MEDS BY RX/DR IN RCRD: CPT | Performed by: INTERNAL MEDICINE

## 2025-07-16 PROCEDURE — 99213 OFFICE O/P EST LOW 20 MIN: CPT | Performed by: INTERNAL MEDICINE

## 2025-07-16 PROCEDURE — 1125F AMNT PAIN NOTED PAIN PRSNT: CPT | Performed by: INTERNAL MEDICINE

## 2025-07-16 PROCEDURE — 3078F DIAST BP <80 MM HG: CPT | Performed by: INTERNAL MEDICINE

## 2025-07-16 PROCEDURE — 1036F TOBACCO NON-USER: CPT | Performed by: INTERNAL MEDICINE

## 2025-07-16 ASSESSMENT — ENCOUNTER SYMPTOMS
DIZZINESS: 1
FEVER: 0
PSYCHIATRIC NEGATIVE: 1
GASTROINTESTINAL NEGATIVE: 1
RESPIRATORY NEGATIVE: 1
CARDIOVASCULAR NEGATIVE: 1
CHILLS: 0
COUGH: 0

## 2025-07-16 ASSESSMENT — PAIN SCALES - GENERAL: PAINLEVEL_OUTOF10: 4

## 2025-07-16 NOTE — ASSESSMENT & PLAN NOTE
Clinically seems like GERD given pts symptoms and clinical history of previous GERD and gastric ulcer.  Does not appear to be pulmonary in etiology.

## 2025-07-16 NOTE — PROGRESS NOTES
Subjective   Patient ID: Patricia Rodriguez is a 79 y.o. female who presents for Lung Eval.  H/o hypothyroid here for follow-up of lung nodule.  9mm nodule seen on calcium scoring.  Still having chest pain.  Repeat CT w/ stable nodule.  No fevers, chills.  Some cough when lying down.  No night sweats or weight loss.  ET is about 75 yards.         Review of Systems   Constitutional:  Negative for chills and fever.   Respiratory: Negative.  Negative for cough.    Cardiovascular: Negative.    Gastrointestinal: Negative.    Neurological:  Positive for dizziness.   Psychiatric/Behavioral: Negative.     All other systems reviewed and are negative.      Objective   Physical Exam  Vitals reviewed.   Constitutional:       Appearance: Normal appearance.   HENT:      Head: Normocephalic and atraumatic.     Eyes:      Extraocular Movements: Extraocular movements intact.       Cardiovascular:      Rate and Rhythm: Normal rate and regular rhythm.      Heart sounds: Normal heart sounds.   Pulmonary:      Effort: Pulmonary effort is normal.      Breath sounds: Normal breath sounds.   Abdominal:      Palpations: Abdomen is soft.      Tenderness: There is no abdominal tenderness.     Musculoskeletal:      Cervical back: Normal range of motion.     Skin:     General: Skin is warm.     Neurological:      General: No focal deficit present.      Mental Status: She is alert and oriented to person, place, and time. Mental status is at baseline.     Psychiatric:         Mood and Affect: Mood normal.         Behavior: Behavior normal.         Assessment/Plan   Problem List Items Addressed This Visit       Other chest pain - Primary    Clinically seems like GERD given pts symptoms and clinical history of previous GERD and gastric ulcer.  Does not appear to be pulmonary in etiology.         Lung nodule    Stable 9mm nodule.  Repeat CT chest in 1 month.         Relevant Orders    CT chest wo IV contrast     RTC in 2 months    Time Spent  Prep time  on day of patient encounter: 5 minutes  Time spent directly with patient, family or caregiver: 10 minutes  Additional Time Spent on Patient Care Activities: 0 minutes  Documentation Time: 5 minutes  Other Time Spent: 0 minutes  Total: 20 minutes        Pantera Cornejo MD 07/16/25 4:18 PM

## 2025-08-18 ENCOUNTER — HOSPITAL ENCOUNTER (OUTPATIENT)
Dept: RADIOLOGY | Facility: HOSPITAL | Age: 80
Discharge: HOME | End: 2025-08-18
Payer: MEDICARE

## 2025-08-18 DIAGNOSIS — R91.1 LUNG NODULE: ICD-10-CM

## 2025-08-18 PROCEDURE — 71250 CT THORAX DX C-: CPT

## 2025-08-18 PROCEDURE — 71250 CT THORAX DX C-: CPT | Performed by: RADIOLOGY

## 2025-09-08 ENCOUNTER — APPOINTMENT (OUTPATIENT)
Dept: PULMONOLOGY | Facility: CLINIC | Age: 80
End: 2025-09-08
Payer: MEDICARE

## 2025-09-22 ENCOUNTER — APPOINTMENT (OUTPATIENT)
Dept: PULMONOLOGY | Facility: CLINIC | Age: 80
End: 2025-09-22
Payer: COMMERCIAL

## 2025-12-30 ENCOUNTER — APPOINTMENT (OUTPATIENT)
Dept: CARDIOLOGY | Facility: CLINIC | Age: 80
End: 2025-12-30
Payer: COMMERCIAL